# Patient Record
Sex: MALE | Race: WHITE | NOT HISPANIC OR LATINO
[De-identification: names, ages, dates, MRNs, and addresses within clinical notes are randomized per-mention and may not be internally consistent; named-entity substitution may affect disease eponyms.]

---

## 2019-06-24 ENCOUNTER — RECORD ABSTRACTING (OUTPATIENT)
Age: 84
End: 2019-06-24

## 2019-06-24 DIAGNOSIS — H26.9 UNSPECIFIED CATARACT: ICD-10-CM

## 2019-06-24 DIAGNOSIS — I25.2 OLD MYOCARDIAL INFARCTION: ICD-10-CM

## 2019-06-24 DIAGNOSIS — Z87.891 PERSONAL HISTORY OF NICOTINE DEPENDENCE: ICD-10-CM

## 2019-06-24 DIAGNOSIS — Z87.898 PERSONAL HISTORY OF OTHER SPECIFIED CONDITIONS: ICD-10-CM

## 2019-06-24 DIAGNOSIS — Z87.440 PERSONAL HISTORY OF URINARY (TRACT) INFECTIONS: ICD-10-CM

## 2019-08-20 ENCOUNTER — RX RENEWAL (OUTPATIENT)
Age: 84
End: 2019-08-20

## 2019-08-21 ENCOUNTER — APPOINTMENT (OUTPATIENT)
Dept: UROLOGY | Facility: CLINIC | Age: 84
End: 2019-08-21
Payer: MEDICARE

## 2019-08-21 VITALS — SYSTOLIC BLOOD PRESSURE: 144 MMHG | WEIGHT: 96 LBS | DIASTOLIC BLOOD PRESSURE: 60 MMHG | HEART RATE: 78 BPM

## 2019-08-21 DIAGNOSIS — R82.71 BACTERIURIA: ICD-10-CM

## 2019-08-21 PROCEDURE — 99214 OFFICE O/P EST MOD 30 MIN: CPT | Mod: 25

## 2019-08-21 PROCEDURE — 51741 ELECTRO-UROFLOWMETRY FIRST: CPT

## 2019-08-21 PROCEDURE — 51798 US URINE CAPACITY MEASURE: CPT | Mod: 59

## 2019-08-21 RX ORDER — METOPROLOL SUCCINATE 25 MG/1
25 TABLET, EXTENDED RELEASE ORAL DAILY
Refills: 0 | Status: ACTIVE | COMMUNITY

## 2019-08-21 RX ORDER — SIMVASTATIN 80 MG/1
TABLET, FILM COATED ORAL
Refills: 0 | Status: ACTIVE | COMMUNITY

## 2019-08-21 RX ORDER — ASPIRIN 81 MG
81 TABLET, DELAYED RELEASE (ENTERIC COATED) ORAL DAILY
Refills: 0 | Status: ACTIVE | COMMUNITY

## 2019-08-21 RX ORDER — TAMSULOSIN HYDROCHLORIDE 0.4 MG/1
0.4 CAPSULE ORAL
Refills: 0 | Status: DISCONTINUED | COMMUNITY
End: 2019-08-21

## 2019-08-21 RX ORDER — LOSARTAN POTASSIUM 100 MG/1
TABLET, FILM COATED ORAL
Refills: 0 | Status: ACTIVE | COMMUNITY

## 2019-08-21 NOTE — REVIEW OF SYSTEMS
[Negative] : Heme/Lymph [Eyesight Problems] : eyesight problems [Joint Pain] : joint pain [Difficulty Walking] : difficulty walking

## 2019-08-21 NOTE — PHYSICAL EXAM
[General Appearance - Well Developed] : well developed [Normal Appearance] : normal appearance [General Appearance - Well Nourished] : well nourished [General Appearance - In No Acute Distress] : no acute distress [Well Groomed] : well groomed [Costovertebral Angle Tenderness] : no ~M costovertebral angle tenderness [Abdomen Tenderness] : non-tender [Abdomen Soft] : soft [Urethral Meatus] : meatus normal [Scrotum] : the scrotum was normal [Urinary Bladder Findings] : the bladder was normal on palpation [Testes Mass (___cm)] : there were no testicular masses [Edema] : no peripheral edema [] : no respiratory distress [Oriented To Time, Place, And Person] : oriented to person, place, and time [Exaggerated Use Of Accessory Muscles For Inspiration] : no accessory muscle use [Respiration, Rhythm And Depth] : normal respiratory rhythm and effort [Mood] : the mood was normal [Affect] : the affect was normal [Not Anxious] : not anxious [No Focal Deficits] : no focal deficits [No Palpable Adenopathy] : no palpable adenopathy [FreeTextEntry1] : using walker

## 2019-11-20 ENCOUNTER — APPOINTMENT (OUTPATIENT)
Dept: UROLOGY | Facility: CLINIC | Age: 84
End: 2019-11-20
Payer: MEDICARE

## 2019-11-20 PROCEDURE — 51798 US URINE CAPACITY MEASURE: CPT | Mod: 59

## 2019-11-20 PROCEDURE — 99214 OFFICE O/P EST MOD 30 MIN: CPT | Mod: 25

## 2019-11-20 PROCEDURE — 51741 ELECTRO-UROFLOWMETRY FIRST: CPT

## 2019-11-20 NOTE — REVIEW OF SYSTEMS
[Joint Pain] : joint pain [Eyesight Problems] : eyesight problems [Difficulty Walking] : difficulty walking [Negative] : Heme/Lymph

## 2019-11-20 NOTE — PHYSICAL EXAM
[General Appearance - Well Developed] : well developed [General Appearance - Well Nourished] : well nourished [Normal Appearance] : normal appearance [Well Groomed] : well groomed [General Appearance - In No Acute Distress] : no acute distress [Abdomen Soft] : soft [Costovertebral Angle Tenderness] : no ~M costovertebral angle tenderness [Abdomen Tenderness] : non-tender [Scrotum] : the scrotum was normal [Urinary Bladder Findings] : the bladder was normal on palpation [Testes Mass (___cm)] : there were no testicular masses [Edema] : no peripheral edema [] : no respiratory distress [Oriented To Time, Place, And Person] : oriented to person, place, and time [Exaggerated Use Of Accessory Muscles For Inspiration] : no accessory muscle use [Respiration, Rhythm And Depth] : normal respiratory rhythm and effort [Mood] : the mood was normal [Affect] : the affect was normal [Not Anxious] : not anxious [No Palpable Adenopathy] : no palpable adenopathy [No Focal Deficits] : no focal deficits [Penis Abnormality] : normal uncircumcised penis [FreeTextEntry1] : severe phimosis, urethral meatus or penis cannot be evaluated

## 2020-01-01 ENCOUNTER — APPOINTMENT (OUTPATIENT)
Dept: UROLOGY | Facility: CLINIC | Age: 85
End: 2020-01-01

## 2020-01-01 ENCOUNTER — RX RENEWAL (OUTPATIENT)
Age: 85
End: 2020-01-01

## 2020-01-01 ENCOUNTER — APPOINTMENT (OUTPATIENT)
Dept: UROLOGY | Facility: CLINIC | Age: 85
End: 2020-01-01
Payer: MEDICARE

## 2020-01-01 VITALS — TEMPERATURE: 98.4 F

## 2020-01-01 DIAGNOSIS — I10 ESSENTIAL (PRIMARY) HYPERTENSION: ICD-10-CM

## 2020-01-01 DIAGNOSIS — N40.1 BENIGN PROSTATIC HYPERPLASIA WITH LOWER URINARY TRACT SYMPMS: ICD-10-CM

## 2020-01-01 DIAGNOSIS — N47.1 PHIMOSIS: ICD-10-CM

## 2020-01-01 DIAGNOSIS — N13.8 BENIGN PROSTATIC HYPERPLASIA WITH LOWER URINARY TRACT SYMPMS: ICD-10-CM

## 2020-01-01 DIAGNOSIS — E78.00 PURE HYPERCHOLESTEROLEMIA, UNSPECIFIED: ICD-10-CM

## 2020-01-01 PROCEDURE — 51798 US URINE CAPACITY MEASURE: CPT

## 2020-01-01 PROCEDURE — 99213 OFFICE O/P EST LOW 20 MIN: CPT | Mod: 25

## 2020-11-11 PROBLEM — E78.00 HIGH CHOLESTEROL: Status: ACTIVE | Noted: 2019-08-21

## 2020-11-11 PROBLEM — N47.1 PHIMOSIS OF PENIS: Status: ACTIVE | Noted: 2019-06-24

## 2020-11-11 PROBLEM — I10 HYPERTENSION: Status: ACTIVE | Noted: 2019-06-24

## 2020-11-11 PROBLEM — N40.1 BENIGN PROSTATIC HYPERPLASIA WITH URINARY OBSTRUCTION AND OTHER LOWER URINARY TRACT SYMPTOMS: Status: ACTIVE | Noted: 2019-06-24

## 2020-11-11 NOTE — HISTORY OF PRESENT ILLNESS
[FreeTextEntry1] : 05/13/2019: The patient is a 89 y/o male who presents with BPH. The last clinic visit was 3 months ago. Symptoms include frequency and nocturia(3). Onset was gradual. There is no known event that preceded symptom onset. The pt describes this as moderate in severity. Associated symptoms do not include abdominal pain, chills or nausea. Current treatment included Tamsulosin and Finasteride. By report there is good compliance with treatment. No associated conditions are noted. Pertinent medical history included hypertension, while pertinent medical history does not include diabetes. Previous presentation included frequency and nocturia. Past evaluation has included PSA, UA, culture, pelvic CT and urology consultation. Past treatment has included laser prostatectomy (2013). \par \par 02/14/2018: Bladder sonogram was done. \par \par 08/21/2019: nocturia, frequency, doing well on Finasteride\par 11/20/2019: No nocturia\par \par 11/11/2020: Pt is here today for follow up for LUTS. Denies hematuria, dysuria, urgency and hesitancy. PVR 9 cc.\par \par O/E: severe phimosis, urethral meatus or penis cannot be evaluated\par KAY: deferred\par \par On Finasteride; will continue; renewed today\par Follow up PRN

## 2020-11-11 NOTE — PHYSICAL EXAM
[General Appearance - Well Developed] : well developed [General Appearance - Well Nourished] : well nourished [Normal Appearance] : normal appearance [Well Groomed] : well groomed [General Appearance - In No Acute Distress] : no acute distress [Abdomen Soft] : soft [Abdomen Tenderness] : non-tender [Costovertebral Angle Tenderness] : no ~M costovertebral angle tenderness [Penis Abnormality] : normal uncircumcised penis [Urinary Bladder Findings] : the bladder was normal on palpation [Scrotum] : the scrotum was normal [Testes Mass (___cm)] : there were no testicular masses [Edema] : no peripheral edema [] : no respiratory distress [Respiration, Rhythm And Depth] : normal respiratory rhythm and effort [Exaggerated Use Of Accessory Muscles For Inspiration] : no accessory muscle use [Oriented To Time, Place, And Person] : oriented to person, place, and time [Affect] : the affect was normal [Mood] : the mood was normal [Not Anxious] : not anxious [No Focal Deficits] : no focal deficits [No Palpable Adenopathy] : no palpable adenopathy [FreeTextEntry1] : using walker

## 2020-11-11 NOTE — ADDENDUM
[FreeTextEntry1] : I, Zamzam Innocent, acted solely as a scribe for Dr. Barry Sandy on this date, 11/11/2020.\par \par All medical record entries made by the Scribe were at my Dr. Barry Sandy direction and personally dictated by me on, 11/11/2020. I have reviewed the chart and agree that the record accurately reflects my personal performance of the history, physical exam, assessment and plan. I have also personally directed, reviewed and agreed with the chart.\par

## 2020-11-11 NOTE — REVIEW OF SYSTEMS
[Eyesight Problems] : eyesight problems [Joint Pain] : joint pain [Difficulty Walking] : difficulty walking [Negative] : Heme/Lymph

## 2021-01-01 ENCOUNTER — EMERGENCY (EMERGENCY)
Facility: HOSPITAL | Age: 86
LOS: 0 days | End: 2021-02-19
Attending: EMERGENCY MEDICINE
Payer: MEDICARE

## 2021-01-01 VITALS — HEIGHT: 62 IN

## 2021-01-01 DIAGNOSIS — E78.5 HYPERLIPIDEMIA, UNSPECIFIED: ICD-10-CM

## 2021-01-01 DIAGNOSIS — F03.90 UNSPECIFIED DEMENTIA WITHOUT BEHAVIORAL DISTURBANCE: ICD-10-CM

## 2021-01-01 DIAGNOSIS — I46.9 CARDIAC ARREST, CAUSE UNSPECIFIED: ICD-10-CM

## 2021-01-01 DIAGNOSIS — I25.10 ATHEROSCLEROTIC HEART DISEASE OF NATIVE CORONARY ARTERY WITHOUT ANGINA PECTORIS: ICD-10-CM

## 2021-01-01 DIAGNOSIS — I10 ESSENTIAL (PRIMARY) HYPERTENSION: ICD-10-CM

## 2021-01-01 LAB — GLUCOSE BLDC GLUCOMTR-MCNC: 133 MG/DL — HIGH (ref 70–99)

## 2021-01-01 PROCEDURE — 99291 CRITICAL CARE FIRST HOUR: CPT

## 2021-01-01 RX ORDER — ASPIRIN/CALCIUM CARB/MAGNESIUM 324 MG
1 TABLET ORAL
Qty: 1 | Refills: 0
Start: 2021-01-01

## 2021-01-01 RX ORDER — METOPROLOL TARTRATE 50 MG
1 TABLET ORAL
Qty: 1 | Refills: 0
Start: 2021-01-01

## 2021-01-01 RX ORDER — FAMOTIDINE 10 MG/ML
1 INJECTION INTRAVENOUS
Qty: 1 | Refills: 0
Start: 2021-01-01

## 2021-01-01 RX ORDER — FINASTERIDE 5 MG/1
1 TABLET, FILM COATED ORAL
Qty: 1 | Refills: 0
Start: 2021-01-01

## 2021-01-01 RX ORDER — RISPERIDONE 4 MG/1
1 TABLET ORAL
Qty: 1 | Refills: 0
Start: 2021-01-01

## 2021-02-19 NOTE — ED PROVIDER NOTE - PHYSICAL EXAMINATION
Gen: unrepsonsive on bipap,   Head: NC, AT, normal lids/conjunctiva   ENT: patent oropharynx without erythema/exudate, uvula midline  Neck: supple, no tenderness/meningismus  Pulm: no spon ae  CV: no cardiac activity  Abd: soft, NT/ND  Mskel: + pitting edema b/l  Skin: no rash, no bruising  Neuro: unrepsonsive

## 2021-02-19 NOTE — ED PROVIDER NOTE - OBJECTIVE STATEMENT
89yo male with pmh HTN, HL, CAD x stent, dementia on risperidone, presents in cardiac arrest. Per family, pt had stopped walking last week and sleeping in recliner since. They noted he stopped eating and coughing up a lot of phlegm in past 3 days. On arrival by EMS, pt with sats in 70% on RA and BP 80s. Per ems, pt was following commands but progressively worse on arrival. On arrival, pt noted to be unresponsive wihtout pulse.     meds: metoprolol, risperidone, isosorbide, finasteride, aspirin

## 2021-02-19 NOTE — ED PROVIDER NOTE - CLINICAL SUMMARY MEDICAL DECISION MAKING FREE TEXT BOX
Code blue called on arrival, intubated, ACLS x 20 min, initially asystole and then vfib/torsades without a pulse, ACLS continued until d/w neice that pt is DNR and TOD called at 938.  Per niece, she, her brother and brother in law are the HCP.     [phone]

## 2021-03-15 ENCOUNTER — RX RENEWAL (OUTPATIENT)
Age: 86
End: 2021-03-15

## 2021-03-15 RX ORDER — FINASTERIDE 5 MG/1
5 TABLET, FILM COATED ORAL DAILY
Qty: 90 | Refills: 0 | Status: ACTIVE | COMMUNITY
Start: 2020-01-01 | End: 1900-01-01